# Patient Record
Sex: FEMALE | Race: BLACK OR AFRICAN AMERICAN | NOT HISPANIC OR LATINO | URBAN - METROPOLITAN AREA
[De-identification: names, ages, dates, MRNs, and addresses within clinical notes are randomized per-mention and may not be internally consistent; named-entity substitution may affect disease eponyms.]

---

## 2017-12-05 ENCOUNTER — TELEPHONE (OUTPATIENT)
Dept: PEDIATRIC CARDIOLOGY | Facility: CLINIC | Age: 32
End: 2017-12-05

## 2017-12-06 DIAGNOSIS — O35.9XX0 SUSPECTED FETAL ABNORMALITY AFFECTING MANAGEMENT OF MOTHER, ANTEPARTUM, SINGLE OR UNSPECIFIED FETUS: Primary | ICD-10-CM

## 2017-12-06 NOTE — TELEPHONE ENCOUNTER
SHAMA for patient. Fetal echo scheduled for 12/14/17 @ 4 pm. Informed patient to come for 3:30 pm. Office address and phone number verified.

## 2017-12-14 ENCOUNTER — OFFICE VISIT (OUTPATIENT)
Dept: PEDIATRIC CARDIOLOGY | Facility: CLINIC | Age: 32
End: 2017-12-14
Attending: PEDIATRICS
Payer: COMMERCIAL

## 2017-12-14 VITALS
DIASTOLIC BLOOD PRESSURE: 70 MMHG | HEART RATE: 84 BPM | WEIGHT: 156.75 LBS | SYSTOLIC BLOOD PRESSURE: 120 MMHG | HEIGHT: 66 IN | BODY MASS INDEX: 25.19 KG/M2

## 2017-12-14 DIAGNOSIS — Z82.79 FAMILY HISTORY OF FIRST DEGREE RELATIVE WITH CONGENITAL HEART DISEASE: ICD-10-CM

## 2017-12-14 DIAGNOSIS — O35.9XX0 SUSPECTED FETAL ABNORMALITY AFFECTING MANAGEMENT OF MOTHER, ANTEPARTUM, SINGLE OR UNSPECIFIED FETUS: ICD-10-CM

## 2017-12-14 PROCEDURE — 76825 ECHO EXAM OF FETAL HEART: CPT | Mod: S$GLB,,, | Performed by: PEDIATRICS

## 2017-12-14 PROCEDURE — 99999 PR PBB SHADOW E&M-EST. PATIENT-LVL III: CPT | Mod: PBBFAC,,, | Performed by: PEDIATRICS

## 2017-12-14 PROCEDURE — 76827 ECHO EXAM OF FETAL HEART: CPT | Mod: S$GLB,,, | Performed by: PEDIATRICS

## 2017-12-14 PROCEDURE — 93325 DOPPLER ECHO COLOR FLOW MAPG: CPT | Mod: S$GLB,,, | Performed by: PEDIATRICS

## 2017-12-14 PROCEDURE — 99242 OFF/OP CONSLTJ NEW/EST SF 20: CPT | Mod: 25,S$GLB,, | Performed by: PEDIATRICS

## 2017-12-14 RX ORDER — PNV NO.95/FERROUS FUM/FOLIC AC 28MG-0.8MG
28 TABLET ORAL DAILY
Refills: 2 | COMMUNITY
Start: 2017-11-20

## 2017-12-15 PROBLEM — Z82.79 FAMILY HISTORY OF FIRST DEGREE RELATIVE WITH CONGENITAL HEART DISEASE: Status: ACTIVE | Noted: 2017-12-15

## 2017-12-15 NOTE — PROGRESS NOTES
"Ms. Chen  is a 32 y.o. year old  , referred by Dr. Russell (Ped. Card.) because of a previous child with congenital heart disease.    The patient presented at approximately 29 1/7 weeks gestation.  The patient denied any complaints.    Past medical history: Unremarkable.  Past surgical history: S/P C/S x 1..  Past gestational history: The patient has a daughter with history of ASD and pulmonary valve stenosis (Michel Chen, 16)    Family history: Negative for congenital heart disease, and sudden death during childhood.    Medications:   Outpatient Encounter Prescriptions as of 2017   Medication Sig Dispense Refill    PRENATAL 28 mg iron- 800 mcg Tab Take 28 mg by mouth once daily.  2     No facility-administered encounter medications on file as of 2017.        Allergies: Patient has no known allergies.    Blood pressure 120/70, pulse 84, height 5' 6" (1.676 m), weight 71.1 kg (156 lb 12 oz).    Fetal echocardiogram revealed a four chamber fetal heart with situs solitus.  The ventricles appeared to be equal in size.  The contractility of both ventricles was good.  The fetal heart rate was within the normal range, and regular.  The interventricular septum appeared to be intact.  There were normally related great arteries seen.  The ductal and aortic arch were well visualized, and appeared to be widely patent.  There was no pleural or pericardial effusion seen.    Doppler analysis revealed a three vessel umbilical cord, with normal flow patterns, and velocities by Doppler.  There was a normal flow pattern seen in the ductus venosus.  There was evidence of normal systemic, and pulmonary venous return seen.  There was a normal right to left shunt seen across the foramen ovale.  There were normal inflow patterns seen across the AV-valves, without significant insufficiency.  There was no ventricular level shunt seen.  The right and left ventricular outflow tract, and ductal and aortic arch " appeared to be unobstructed.    Impression:  It is our impression that Ms. Chen had a normal fetal echocardiogram.  As you know, small defects, and coarctation of the aorta cannot always be ruled out on fetal echocardiogram.  We discussed our findings with the patient, reviewed our images, and answered her questions. We also discussed the limitations of fetal echocardiography, but emphasized that her child appears to have normal cardiac anatomy.  No further follow up is scheduled in our clinic, but, of course, we will always be available to reevaluate this patient, if needed.  Time spent: 30 minutes, 50% dedicated to counseling.